# Patient Record
Sex: MALE | Race: WHITE | NOT HISPANIC OR LATINO | Employment: OTHER | URBAN - METROPOLITAN AREA
[De-identification: names, ages, dates, MRNs, and addresses within clinical notes are randomized per-mention and may not be internally consistent; named-entity substitution may affect disease eponyms.]

---

## 2023-07-12 ENCOUNTER — OFFICE VISIT (OUTPATIENT)
Dept: URGENT CARE | Facility: CLINIC | Age: 43
End: 2023-07-12
Payer: MEDICARE

## 2023-07-12 VITALS
HEART RATE: 70 BPM | RESPIRATION RATE: 18 BRPM | SYSTOLIC BLOOD PRESSURE: 174 MMHG | HEIGHT: 74 IN | WEIGHT: 270 LBS | OXYGEN SATURATION: 98 % | TEMPERATURE: 98 F | BODY MASS INDEX: 34.65 KG/M2 | DIASTOLIC BLOOD PRESSURE: 117 MMHG

## 2023-07-12 DIAGNOSIS — G47.00 INSOMNIA, UNSPECIFIED TYPE: Primary | ICD-10-CM

## 2023-07-12 DIAGNOSIS — F41.9 ANXIETY: ICD-10-CM

## 2023-07-12 PROCEDURE — 99203 OFFICE O/P NEW LOW 30 MIN: CPT | Mod: ,,,

## 2023-07-12 PROCEDURE — 99203 PR OFFICE/OUTPT VISIT, NEW, LEVL III, 30-44 MIN: ICD-10-PCS | Mod: ,,,

## 2023-07-12 NOTE — PROGRESS NOTES
"Subjective:      Patient ID: Julio Morrison is a 42 y.o. male.    Vitals:  height is 6' 2" (1.88 m) and weight is 122.5 kg (270 lb). His oral temperature is 98 °F (36.7 °C). His blood pressure is 174/117 (abnormal) and his pulse is 70. His respiration is 18 and oxygen saturation is 98%.     Chief Complaint: Insomnia    Patient is a 42 y.o. male with a history of bipolar who not currently on any medications who presents to urgent care with a list of complaints consistent of anxious feelings, teeth pressure, chest tightness and pain, leg cramps and muscle tightness, trouble sleeping at night, tremors, nausea, heartburn, "cloudy" thoughts x2 weeks. Patient reports that he has been up for 36 hours and that he feels like he used to feel when he was coming off of crystal meth but denies any drug use.   He states that when he went to the emergency room this morning he could not think of any of these symptoms, was tearful and had been up for so long that his mind was not all there. States when he got home from ER he made a list of the symptoms he is feeling.     Upon review of chart it appears he went to the emergency room this morning around 0500.  Was diagnosed with insomnia/anxiety and given Vistaril to take.  Drug screen was done at that visit and was only positive for THC. Doctors note does not have any of the symptoms he has listed for me today.    Denies any suicidal or homicidal thoughts at this time.    Psychiatric/Behavioral:  Positive for nervous/anxious and sleep disturbance. Negative for homicidal ideas, suicidal ideas and self-injury. The patient is nervous/anxious.     Objective:     Physical Exam   Constitutional: He is oriented to person, place, and time. He is cooperative. He does not appear ill. No distress.   HENT:   Ears:   Right Ear: Tympanic membrane normal.   Left Ear: Tympanic membrane normal.   Nose: Nose normal.   Mouth/Throat: Uvula is midline and mucous membranes are normal. Mucous membranes are " moist. Oropharynx is clear.   Neck: Neck supple.   Cardiovascular: Normal rate, regular rhythm, normal heart sounds and normal pulses.   Pulmonary/Chest: Effort normal and breath sounds normal.   Abdominal: Normal appearance.   Neurological: He is alert and oriented to person, place, and time.   Skin: Skin is warm. Capillary refill takes less than 2 seconds.   Psychiatric: He experiences No auditory hallucinations and No visual hallucinations. His mood appears anxious.   Nursing note and vitals reviewed.    Assessment:     1. Insomnia, unspecified type    2. Anxiety        Plan:     Explained to patient that in an urgent care setting we can not prescribe any anxiety or depression medications further than what he was already prescribed an emergency room earlier today (Vistaril).     Explained need for primary care doctor or psychiatrist to further prescribe medications.     Strict ER precautions given for any homicidal or suicidal thoughts.     Offered to put in a referral to Psychiatry but patient states that he is not from here and does not not think he will be in [place] for long.     He denies any suicidal or homicidal ideations.     I did voice that patient should go to emergency room again and tell them symptoms that he may need a inpatient psychiatric visit, he refused.     Insomnia, unspecified type    Anxiety

## 2023-08-17 ENCOUNTER — HOSPITAL ENCOUNTER (EMERGENCY)
Facility: HOSPITAL | Age: 43
Discharge: HOME | End: 2023-08-17
Attending: EMERGENCY MEDICINE
Payer: COMMERCIAL

## 2023-08-17 VITALS
SYSTOLIC BLOOD PRESSURE: 156 MMHG | HEIGHT: 74 IN | RESPIRATION RATE: 18 BRPM | WEIGHT: 250 LBS | TEMPERATURE: 99 F | OXYGEN SATURATION: 99 % | HEART RATE: 61 BPM | DIASTOLIC BLOOD PRESSURE: 96 MMHG | BODY MASS INDEX: 32.08 KG/M2

## 2023-08-17 DIAGNOSIS — L03.113 CELLULITIS OF RIGHT FOREARM: Primary | ICD-10-CM

## 2023-08-17 DIAGNOSIS — Z76.0 MEDICATION REFILL: ICD-10-CM

## 2023-08-17 DIAGNOSIS — I10 HYPERTENSION, UNSPECIFIED TYPE: ICD-10-CM

## 2023-08-17 PROCEDURE — 25000003 PHARM REV CODE 250: Performed by: NURSE PRACTITIONER

## 2023-08-17 PROCEDURE — 99283 EMERGENCY DEPT VISIT LOW MDM: CPT

## 2023-08-17 RX ORDER — CLONIDINE HYDROCHLORIDE 0.1 MG/1
0.1 TABLET ORAL 2 TIMES DAILY
Qty: 60 TABLET | Refills: 0 | Status: SHIPPED | OUTPATIENT
Start: 2023-08-17 | End: 2023-09-16

## 2023-08-17 RX ORDER — SULFAMETHOXAZOLE AND TRIMETHOPRIM 800; 160 MG/1; MG/1
1 TABLET ORAL 2 TIMES DAILY
Qty: 14 TABLET | Refills: 0 | Status: SHIPPED | OUTPATIENT
Start: 2023-08-17 | End: 2023-08-24

## 2023-08-17 RX ORDER — CLONIDINE HYDROCHLORIDE 0.1 MG/1
0.2 TABLET ORAL
Status: COMPLETED | OUTPATIENT
Start: 2023-08-17 | End: 2023-08-17

## 2023-08-17 RX ADMIN — CLONIDINE HYDROCHLORIDE 0.2 MG: 0.1 TABLET ORAL at 12:08

## 2023-08-17 NOTE — ED TRIAGE NOTES
Pt presents with large amt redness and swelling to right forearm that is worsening over the days 7 days from insect bite (2) HTN, but not taking BP med for 2 months (3) sinus problems

## 2023-08-17 NOTE — ED PROVIDER NOTES
"Encounter Date: 8/17/2023       History     Chief Complaint   Patient presents with    Wound Infection     Pt presents with large amt redness and swelling to right forearm that is worsening over the past  7 days from an insect bite (2) HTN, but not taking BP med for 2 months (3) sinus problems     Patient states that he began with an "insect bite" to his right inner forearm x 5 days ago. States that he tried to drain it himself and is now having redness and swelling to his right inner forearm. States that when he "squeezes" the wound he only gets clear yellow fluid. Denies any fever. States that he is also out of his Clonidine for HTN. Hx. Of HTN.     The history is provided by the patient.     Review of patient's allergies indicates:  No Known Allergies  Past Medical History:   Diagnosis Date    Anxiety     Bipolar disorder     Depression      Past Surgical History:   Procedure Laterality Date    TYMPANOSTOMY TUBE PLACEMENT       Family History   Family history unknown: Yes     Social History     Tobacco Use    Smoking status: Former     Current packs/day: 0.00     Types: Cigarettes    Smokeless tobacco: Never   Substance Use Topics    Alcohol use: Yes     Comment: occassionally    Drug use: Never     Review of Systems   Constitutional: Negative.  Negative for chills and fever.   HENT: Negative.     Eyes: Negative.    Respiratory: Negative.     Cardiovascular: Negative.    Gastrointestinal: Negative.    Endocrine: Negative.    Genitourinary: Negative.    Musculoskeletal: Negative.    Skin:         Redness   Allergic/Immunologic: Negative.    Neurological: Negative.    Hematological: Negative.    Psychiatric/Behavioral: Negative.     All other systems reviewed and are negative.      Physical Exam     Initial Vitals [08/17/23 1143]   BP Pulse Resp Temp SpO2   (!) 175/108 61 18 98.5 °F (36.9 °C) 99 %      MAP       --         Physical Exam    Nursing note and vitals reviewed.  Constitutional: He appears well-developed " "and well-nourished. No distress.   HENT:   Head: Normocephalic and atraumatic.   Mouth/Throat: Oropharynx is clear and moist.   Eyes: Conjunctivae and EOM are normal. Pupils are equal, round, and reactive to light.   Neck: Neck supple.   Normal range of motion.  Cardiovascular:  Normal rate, regular rhythm, normal heart sounds and intact distal pulses.           Pulmonary/Chest: Breath sounds normal. No respiratory distress.   Abdominal: Abdomen is soft. He exhibits no distension. There is no abdominal tenderness.   Musculoskeletal:         General: No edema. Normal range of motion.      Cervical back: Normal range of motion and neck supple.     Neurological: He is alert and oriented to person, place, and time. He has normal strength. GCS score is 15. GCS eye subscore is 4. GCS verbal subscore is 5. GCS motor subscore is 6.   Skin: Skin is warm and dry. No rash noted. There is erythema (There is an approximately 8x9 cm area of erythema to mid inner right forearm with a scabbed over wound in the center. Area with mild swelling. There is no obvious area of fluctuance noted and no drainage present.).        Psychiatric: He has a normal mood and affect. Thought content normal.         ED Course   Procedures  Labs Reviewed - No data to display       Imaging Results    None          Medications   cloNIDine tablet 0.2 mg (0.2 mg Oral Given 8/17/23 1230)     Medical Decision Making:   Initial Assessment:   Patient states that he began with an "insect bite" to his right inner forearm x 5 days ago. States that he tried to drain it himself and is now having redness and swelling to his right inner forearm. States that when he "squeezes" the wound he only gets clear yellow fluid. Denies any fever. States that he is also out of his Clonidine for HTN. Hx. Of HTN.     The history is provided by the patient.   Patient is awake, alert, afebrile, and nontoxic appearing in the ED.   Differential Diagnosis:   Cellulitis, Abscess, Insect " Bite, Medication Refill, HTN.   ED Management:  Patient with an area of cellulitis to right inner forearm surrounding a scabbed over appearing wound. There is no palpable area of fluctuance or abscess on exam. Will treat with Bactrim PO and refill patient's hypertension medication. Discussed with patient that if any fever, increase in redness, swelling, or any increase or worsening to immediately return to the ED.                           Clinical Impression:   Final diagnoses:  [L03.113] Cellulitis of right forearm (Primary)  [I10] Hypertension, unspecified type  [Z76.0] Medication refill        ED Disposition Condition    Discharge Stable          ED Prescriptions       Medication Sig Dispense Start Date End Date Auth. Provider    sulfamethoxazole-trimethoprim 800-160mg (BACTRIM DS) 800-160 mg Tab Take 1 tablet by mouth 2 (two) times daily. for 7 days 14 tablet 8/17/2023 8/24/2023 Micheline Shanks FNP    cloNIDine (CATAPRES) 0.1 MG tablet Take 1 tablet (0.1 mg total) by mouth 2 (two) times daily. 60 tablet 8/17/2023 9/16/2023 Micheline Shanks FNP          Follow-up Information       Follow up With Specialties Details Why Contact Info    Please call [phone] to arrange a follow-up appointment with a Primary Care Provider.  In 1 week               Micheline Shanks FNP  08/17/23 8218